# Patient Record
Sex: FEMALE | Race: WHITE | NOT HISPANIC OR LATINO | Employment: OTHER | URBAN - METROPOLITAN AREA
[De-identification: names, ages, dates, MRNs, and addresses within clinical notes are randomized per-mention and may not be internally consistent; named-entity substitution may affect disease eponyms.]

---

## 2019-04-19 ENCOUNTER — APPOINTMENT (OUTPATIENT)
Dept: RADIOLOGY | Facility: MEDICAL CENTER | Age: 39
End: 2019-04-19
Attending: OBSTETRICS & GYNECOLOGY
Payer: OTHER MISCELLANEOUS

## 2019-04-19 ENCOUNTER — HOSPITAL ENCOUNTER (OUTPATIENT)
Facility: MEDICAL CENTER | Age: 39
End: 2019-04-19
Attending: OBSTETRICS & GYNECOLOGY | Admitting: OBSTETRICS & GYNECOLOGY
Payer: OTHER MISCELLANEOUS

## 2019-04-19 VITALS
OXYGEN SATURATION: 99 % | HEART RATE: 94 BPM | TEMPERATURE: 98.6 F | DIASTOLIC BLOOD PRESSURE: 61 MMHG | RESPIRATION RATE: 18 BRPM | SYSTOLIC BLOOD PRESSURE: 94 MMHG

## 2019-04-19 LAB
APPEARANCE UR: CLEAR
COLOR UR AUTO: YELLOW
GLUCOSE UR QL STRIP.AUTO: NEGATIVE MG/DL
KETONES UR QL STRIP.AUTO: NEGATIVE MG/DL
LEUKOCYTE ESTERASE UR QL STRIP.AUTO: ABNORMAL
NITRITE UR QL STRIP.AUTO: NEGATIVE
PH UR STRIP.AUTO: 7.5 [PH]
PROT UR QL STRIP: NEGATIVE MG/DL
RBC UR QL AUTO: NEGATIVE
SP GR UR: 1.01

## 2019-04-19 PROCEDURE — 93971 EXTREMITY STUDY: CPT | Mod: LT

## 2019-04-19 PROCEDURE — 81002 URINALYSIS NONAUTO W/O SCOPE: CPT

## 2019-04-19 PROCEDURE — 59025 FETAL NON-STRESS TEST: CPT

## 2019-04-19 NOTE — PROGRESS NOTES
Department of Obstetrics and Gynecology  Labor and Delivery Assessment Note    ID: 39 y.o. is a  with IUP at 28.6 weeks    Primary Obstetrician: Camille Gutierrez M.D.    Assessing Obstetrician: Blanca Oconnor MD    CC: left inner thigh redness and pain    HPI: Pt presents to OB triage with complaint of redness, tenderness, pain to inner aspect of her left thigh since this morning. [place] and had swelling yesterday. Concerned about DVT. No CP/SOB. Occas [place] contractions, no vaginal bleeding or leaking of fluids. Good fetal movement.    O: BP (!) 94/61   Pulse 94   Temp 37 °C (98.6 °F) (Temporal)   Resp 18   LMP 2018   SpO2 99%    Gen: NAD, AAO  Abd: Gravid, soft, uterus NTTP, no rebound/guarding  Ext: WWP, 2+ DPP, no edema, medial aspect of left thigh with palpable superficial vein with overlying skin erythema/warmth, no purulence/drainage, non-tender, negative Homans sign    FHT: 140s, pos accels, neg decels, moderate variability, reactive NST, cat 1 FHR  [place]: occas contractions  SVE: deferred    A/P: Lena Sutherland is a 39 y.o.  at 28.6 weeks.    Superficial thrombophlebitis in left upper leg. NO evidence of DVT on lower extremity venous doppler u/s.    AVSS.  Reassuring, reactive FHT.    Pt will be discharged home  Regular daily exercise/activity advised  Waist high compression stockings  Warm compresses to area QID  Tylenol prn, avoid ibuprofen due to third trimester  Increase PO hydration, 1 gal of water daily  Return prn concerns      Blanca Oconnor M.D., 2019, 12:45 PM

## 2019-04-19 NOTE — PROGRESS NOTES
at 38-4 presents with concerns for having a DVT. Report recent air travel with feet swelling and today has developed left inner thigh edema, redness, and pain. Does report having groin varicosities. Denies leaking, bleeding, and ctx. Reports +FM. TOCO/EFM placed. Vitals as charted. POC discussed. Visual inspection reveals area of redness and swelling approx 4 cm x 6 cm. Report to Dr. Oconnor. Orders received.   1105- US at bedside.   Summary Report: US results reviewed with Dr. Oconnor and FHT/TOCO tracing reviewed. MD reviewed results with pt at bedside. Discharge orders received.  labor and fetal kick count precautions discussed, along with recommendation received from Dr. Palma (see scanned hand out). Pt verbalized understanding. Declined w/c for discharge. Home, ambulatory by self at 1325.

## 2019-04-19 NOTE — DISCHARGE INSTRUCTIONS
General Instructions:  · If you think you are in labor, time contractions (lying on your left side) from the beginning of one contraction to the beginning of the next contraction for at least one hour.  · Increase fluid intake: you should consume 10-12 8 oz glasses of non-caffeinated fluid per day.  · Report any pressure or burning on urination to your physician.  · Monitor fetal movement: If you notice an absence or decrease in fetal movement, drink a large glass of water and rest on your side.  If there is no increase in movement, call your physician or go to the hospital for further evaluation.  · Report any sudden, sharp abdominal pain.  · Report any bleeding.  Spotting or pinkish discharge is normal after vaginal exam.  You may also spot after sexual intercourse.    Pre-term Labor (<37 weeks):  Call your physician or return to the hospital if:  · You have painless regular contractions more than 4 in one hour.  · Your water breaks (remember time and color).  · You have menstrual-like cramps, a low dull backache or pressure in your pelvis or back.  · Your baby does not move enough to complete the daily kick count (10 movements in 2 hours).  · Your baby moves much less often than on the days before or you have not felt your baby move all day.  · Please review the MEDICATION LIST section of your AFTER VISIT SUMMARY document.  · Take your medication as prescribed      Other Instructions:  Please carefully review your entire AFTER VISIT SUMMARY document for all discharge instructions.    Increase fluid intake (1 gallon daily)  Walk at least 30 mins daily.  Wear waist high compression stockings.   Use warm compresses 4 times daily and Tylenol as directed as needed for comfort. Avoid Ibuprofen.  Keep regularly scheduled appointments.  Return to hospital sooner for worsening symptoms, signs of  labor, if baby isn't moving well, or other concerns.

## 2023-07-20 ENCOUNTER — APPOINTMENT (RX ONLY)
Dept: URBAN - METROPOLITAN AREA CLINIC 340 | Facility: CLINIC | Age: 43
Setting detail: DERMATOLOGY
End: 2023-07-20

## 2023-07-20 DIAGNOSIS — D18.0 HEMANGIOMA: ICD-10-CM | Status: INADEQUATELY CONTROLLED

## 2023-07-20 DIAGNOSIS — L71.8 OTHER ROSACEA: ICD-10-CM | Status: INADEQUATELY CONTROLLED

## 2023-07-20 PROBLEM — D18.01 HEMANGIOMA OF SKIN AND SUBCUTANEOUS TISSUE: Status: ACTIVE | Noted: 2023-07-20

## 2023-07-20 PROCEDURE — ? PRESCRIPTION MEDICATION MANAGEMENT

## 2023-07-20 PROCEDURE — ? OTHER

## 2023-07-20 PROCEDURE — ? COUNSELING

## 2023-07-20 PROCEDURE — ? PRESCRIPTION

## 2023-07-20 PROCEDURE — 99204 OFFICE O/P NEW MOD 45 MIN: CPT

## 2023-07-20 PROCEDURE — ? PHOTO-DOCUMENTATION

## 2023-07-20 PROCEDURE — ? OTC TREATMENT REGIMEN

## 2023-07-20 RX ORDER — HYDROCORTISONE 25 MG/G
CREAM TOPICAL
Qty: 30 | Refills: 2 | Status: ERX | COMMUNITY
Start: 2023-07-20

## 2023-07-20 RX ORDER — DOXYCYCLINE 40 MG/1
CAPSULE ORAL
Qty: 30 | Refills: 2 | Status: ERX | COMMUNITY
Start: 2023-07-20

## 2023-07-20 RX ORDER — AZELAIC ACID 0.15 G/G
AEROSOL, FOAM TOPICAL
Qty: 50 | Refills: 3 | Status: ERX | COMMUNITY
Start: 2023-07-20

## 2023-07-20 RX ADMIN — DOXYCYCLINE: 40 CAPSULE ORAL at 00:00

## 2023-07-20 RX ADMIN — AZELAIC ACID: 0.15 AEROSOL, FOAM TOPICAL at 00:00

## 2023-07-20 RX ADMIN — HYDROCORTISONE: 25 CREAM TOPICAL at 00:00

## 2023-07-20 ASSESSMENT — LOCATION ZONE DERM: LOCATION ZONE: FACE

## 2023-07-20 ASSESSMENT — LOCATION DETAILED DESCRIPTION DERM
LOCATION DETAILED: RIGHT INFERIOR MEDIAL FOREHEAD
LOCATION DETAILED: LEFT MEDIAL MALAR CHEEK
LOCATION DETAILED: RIGHT MEDIAL MALAR CHEEK

## 2023-07-20 ASSESSMENT — LOCATION SIMPLE DESCRIPTION DERM
LOCATION SIMPLE: RIGHT CHEEK
LOCATION SIMPLE: LEFT CHEEK
LOCATION SIMPLE: RIGHT FOREHEAD

## 2023-07-20 NOTE — PROCEDURE: OTC TREATMENT REGIMEN
Patient Specific Otc Recommendations (Will Not Stick From Patient To Patient): Elta MD Clear - apply once in the morning after cleansing \\nCicalfate- apply to the full face
Detail Level: Zone

## 2023-07-20 NOTE — PROCEDURE: OTHER
Note Text (......Xxx Chief Complaint.): This diagnosis correlates with the
Detail Level: Zone
Other (Free Text): Cauterized today.
Render Risk Assessment In Note?: no

## 2023-07-20 NOTE — PROCEDURE: PRESCRIPTION MEDICATION MANAGEMENT
Render In Strict Bullet Format?: No
Detail Level: Zone
Initiate Treatment: Oracea 40 mg capsule,immediate - Take one pill by mouth once daily with a meal for one month.\\n\\nhydrocortisone 2.5 % topical cream- Apply to face twice daily for 2 weeks then as needed for flares. Do not use more than 15 days in a month.\\n\\nFinacea 15 % topical foam- Apply to full face 1x nightly after cleansing.

## 2023-08-24 ENCOUNTER — APPOINTMENT (RX ONLY)
Dept: URBAN - METROPOLITAN AREA CLINIC 340 | Facility: CLINIC | Age: 43
Setting detail: DERMATOLOGY
End: 2023-08-24

## 2023-08-24 DIAGNOSIS — L71.8 OTHER ROSACEA: ICD-10-CM

## 2023-08-24 DIAGNOSIS — L82.0 INFLAMED SEBORRHEIC KERATOSIS: ICD-10-CM | Status: INADEQUATELY CONTROLLED

## 2023-08-24 PROCEDURE — ? PRESCRIPTION MEDICATION MANAGEMENT

## 2023-08-24 PROCEDURE — ? COUNSELING

## 2023-08-24 PROCEDURE — ? PHOTO-DOCUMENTATION

## 2023-08-24 PROCEDURE — 17110 DESTRUCTION B9 LES UP TO 14: CPT

## 2023-08-24 PROCEDURE — 99214 OFFICE O/P EST MOD 30 MIN: CPT | Mod: 25

## 2023-08-24 PROCEDURE — ? LIQUID NITROGEN

## 2023-08-24 PROCEDURE — ? OTHER

## 2023-08-24 ASSESSMENT — LOCATION DETAILED DESCRIPTION DERM
LOCATION DETAILED: LEFT INFERIOR CENTRAL MALAR CHEEK
LOCATION DETAILED: LEFT CENTRAL MALAR CHEEK
LOCATION DETAILED: RIGHT CENTRAL MALAR CHEEK

## 2023-08-24 ASSESSMENT — LOCATION SIMPLE DESCRIPTION DERM
LOCATION SIMPLE: RIGHT CHEEK
LOCATION SIMPLE: LEFT CHEEK

## 2023-08-24 ASSESSMENT — LOCATION ZONE DERM: LOCATION ZONE: FACE

## 2023-08-24 NOTE — PROCEDURE: OTHER
Other (Free Text): Pt to f/u with EB for residual redness / erythema.  Cheek, nose, cheek, chin.  Quoted ~400 per session; expect 1-3 sessions depending on desired outcome and response to tx.  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 Other (Free Text): Pt to f/u with EB for residual redness / erythema.  Cheek, nose, cheek, chin.  Quoted ~400 per session; expect 1-3 sessions depending on desired outcome and response to tx.  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

## 2023-08-24 NOTE — PROCEDURE: PRESCRIPTION MEDICATION MANAGEMENT
Render In Strict Bullet Format?: No
Plan: Pt can call for refills
Detail Level: Zone
Modify Regimen: Hydrocortisone only PRN x flares
Continue Regimen: Oracea nightly\\nFinacea 15 % topical foam- Apply to full face 1x nightly after cleansing.
None

## 2024-01-29 ENCOUNTER — RX ONLY (OUTPATIENT)
Age: 44
Setting detail: RX ONLY
End: 2024-01-29

## 2024-01-29 RX ORDER — DOXYCYCLINE 40 MG/1
CAPSULE ORAL
Qty: 30 | Refills: 2 | Status: ERX